# Patient Record
Sex: FEMALE | Race: WHITE | NOT HISPANIC OR LATINO | Employment: FULL TIME | URBAN - METROPOLITAN AREA
[De-identification: names, ages, dates, MRNs, and addresses within clinical notes are randomized per-mention and may not be internally consistent; named-entity substitution may affect disease eponyms.]

---

## 2024-05-30 ENCOUNTER — PROCEDURE VISIT (OUTPATIENT)
Dept: OBSTETRICS AND GYNECOLOGY | Facility: CLINIC | Age: 34
End: 2024-05-30
Payer: COMMERCIAL

## 2024-05-30 ENCOUNTER — OFFICE VISIT (OUTPATIENT)
Dept: OBSTETRICS AND GYNECOLOGY | Facility: CLINIC | Age: 34
End: 2024-05-30
Payer: COMMERCIAL

## 2024-05-30 VITALS — WEIGHT: 140.81 LBS | HEART RATE: 86 BPM | DIASTOLIC BLOOD PRESSURE: 68 MMHG | SYSTOLIC BLOOD PRESSURE: 108 MMHG

## 2024-05-30 DIAGNOSIS — Z11.3 ENCOUNTER FOR SCREENING FOR INFECTIONS WITH PREDOMINANTLY SEXUAL MODE OF TRANSMISSION: ICD-10-CM

## 2024-05-30 DIAGNOSIS — Z87.42 HISTORY OF INFERTILITY: ICD-10-CM

## 2024-05-30 DIAGNOSIS — Z34.90 PREGNANCY, UNSPECIFIED GESTATIONAL AGE: Primary | ICD-10-CM

## 2024-05-30 DIAGNOSIS — R10.9 ABDOMINAL PAIN, UNSPECIFIED ABDOMINAL LOCATION: ICD-10-CM

## 2024-05-30 DIAGNOSIS — Z34.90 PREGNANCY, UNSPECIFIED GESTATIONAL AGE: ICD-10-CM

## 2024-05-30 DIAGNOSIS — Z3A.01 LESS THAN 8 WEEKS GESTATION OF PREGNANCY: ICD-10-CM

## 2024-05-30 DIAGNOSIS — Z01.419 WELL WOMAN EXAM WITH ROUTINE GYNECOLOGICAL EXAM: Primary | ICD-10-CM

## 2024-05-30 DIAGNOSIS — R10.2 PELVIC PAIN: ICD-10-CM

## 2024-05-30 LAB
B-HCG SERPL-ACNC: NORMAL MIU/ML
B-HCG UR QL: POSITIVE
BILIRUB SERPL-MCNC: NEGATIVE MG/DL
BLOOD URINE, POC: NEGATIVE
CLARITY, POC UA: CLEAR
COLOR, POC UA: YELLOW
CTP QC/QA: YES
GLUCOSE UR QL STRIP: NEGATIVE
KETONES UR QL STRIP: NEGATIVE
LEUKOCYTE ESTERASE URINE, POC: NEGATIVE
NITRITE, POC UA: NEGATIVE
PH, POC UA: 6
PROGEST SERPL-MCNC: 20.2 NG/ML
PROTEIN, POC: NEGATIVE
SPECIFIC GRAVITY, POC UA: >1.03
UROBILINOGEN, POC UA: 0.2

## 2024-05-30 PROCEDURE — 99385 PREV VISIT NEW AGE 18-39: CPT | Mod: 25,S$GLB,,

## 2024-05-30 PROCEDURE — 1160F RVW MEDS BY RX/DR IN RCRD: CPT | Mod: CPTII,S$GLB,,

## 2024-05-30 PROCEDURE — 81025 URINE PREGNANCY TEST: CPT | Mod: S$GLB,,,

## 2024-05-30 PROCEDURE — 3074F SYST BP LT 130 MM HG: CPT | Mod: CPTII,S$GLB,,

## 2024-05-30 PROCEDURE — 81002 URINALYSIS NONAUTO W/O SCOPE: CPT | Mod: S$GLB,,,

## 2024-05-30 PROCEDURE — 3044F HG A1C LEVEL LT 7.0%: CPT | Mod: CPTII,S$GLB,,

## 2024-05-30 PROCEDURE — 3078F DIAST BP <80 MM HG: CPT | Mod: CPTII,S$GLB,,

## 2024-05-30 PROCEDURE — 76817 TRANSVAGINAL US OBSTETRIC: CPT | Mod: S$GLB,,, | Performed by: OBSTETRICS & GYNECOLOGY

## 2024-05-30 PROCEDURE — 1159F MED LIST DOCD IN RCRD: CPT | Mod: CPTII,S$GLB,,

## 2024-05-30 RX ORDER — DEXLANSOPRAZOLE 60 MG/1
CAPSULE, DELAYED RELEASE ORAL
COMMUNITY
Start: 2023-01-01

## 2024-05-30 RX ORDER — LINACLOTIDE 145 UG/1
CAPSULE, GELATIN COATED ORAL
COMMUNITY
Start: 2024-05-29

## 2024-05-30 RX ORDER — TEMAZEPAM 30 MG/1
1 CAPSULE ORAL NIGHTLY
COMMUNITY
Start: 2023-05-04

## 2024-05-30 NOTE — PROGRESS NOTES
Subjective:      Patient ID: Nadira Nicole is a 34 y.o. female who presents for evaluation today.    Chief Complaint:    Menorrhagia (Pt states that she started her cycle on 5/13 and has been bleeding since. She is also having left sided abdominal and back pain. She states that her cycle are usually regular and only last 3-4 days. ) and Well Woman      History of Present Illness  Annual Exam (Premenopausal) - Patient presents for annual exam. The patient also has complaints of abdominal/back pain. The patient is sexually active. GYN screening history: last pap: was normal. The patient wears seatbelts: yes. The patient participates in regular exercise: no. Has the patient ever been transfused or tattooed?: not asked. The patient reports that there is not domestic violence in her life. She started her period on may 13, she had a normal flow at first, then continued to have brown blood.  She is also having left sided back & abdominal pain. She denies urinary symptoms. She is on linzess for constipation with Colon. She reports hx of endometriosis, she had a surgery last year to remove this along with a uterine suspension done by Dr. Vidal. She is going to see fertility Dr. Panda as she and her  have been trying to conceive for 4 years.     GYN History  Patient's last menstrual period was 05/13/2024 (exact date).   Date of Last Pap: Up to date in accordance with ASCCP guidelines Pap smear schedule reviewed with patient Result history reviewed with patient Pap smear not performed    VITALS  /68   Pulse 86   Wt 63.9 kg (140 lb 12.8 oz)   LMP 05/13/2024 (Exact Date)   Weight: 63.9 kg (140 lb 12.8 oz)         PAST MEDICAL HISTORY  Past Medical History:   Diagnosis Date    Abnormal uterine bleeding 05/13/24    Chiari malformation type I     Dyspareunia 2016    Endometriosis of uterus 2016    Hormone disorder 2021    Infertility, female 2016       PAST SURGICAL HISTORY  Past Surgical History:    Procedure Laterality Date    ABDOMINAL SURGERY  05/02/2022    AUGMENTATION OF BREAST  2018    HYSTEROSCOPY  05/02/2022    PELVIC LAPAROSCOPY  2017 & 2018    SUBOCCIPITAL CRANIECTOMY WITH LAMINECTOMY OF CERVICAL SPINE  01/17/2024       SOCIAL HISTORY  Social History     Tobacco Use   Smoking Status Never   Smokeless Tobacco Never   ,   Social History     Substance and Sexual Activity   Alcohol Use Yes    Comment: Socially        MEDICATIONS  Outpatient Medications Marked as Taking for the 5/30/24 encounter (Office Visit) with Yolande Isabel NP   Medication Sig Dispense Refill    DEXILANT 60 mg capsule       LINZESS 145 mcg Cap capsule       temazepam (RESTORIL) 30 mg capsule Take 1 capsule by mouth every evening.           Review of Systems   Review of Systems   Constitutional:  Negative for activity change, chills and fever.   Eyes:  Negative for visual disturbance.   Respiratory:  Negative for shortness of breath.    Cardiovascular:  Negative for chest pain.   Gastrointestinal:  Positive for constipation and nausea. Negative for abdominal pain, diarrhea and vomiting.   Genitourinary:  Positive for pelvic pain. Negative for dysuria, frequency, hematuria, urgency and vaginal bleeding.        No abnormal vaginal bleeding   Musculoskeletal:  Positive for back pain.   Integumentary:  Negative for rash and breast mass.   Neurological:  Negative for numbness and headaches.   Psychiatric/Behavioral:          No mood disturbance or changes    Breast: Negative for mass          Objective:     Physical Exam:   Constitutional: She is oriented to person, place, and time. She appears well-developed. She is cooperative.    HENT:   Head: Normocephalic.     Neck: Trachea normal. No thyromegaly present.    Cardiovascular:  Normal rate, regular rhythm and normal heart sounds.             Pulmonary/Chest: Effort normal and breath sounds normal. Right breast exhibits no mass, no nipple discharge and no skin change. Left breast  exhibits no mass, no nipple discharge and no skin change.        Abdominal: Soft. There is no abdominal tenderness. There is no rebound and no guarding.     Genitourinary:    Vagina and uterus normal.      Pelvic exam was performed with patient supine.   The external female genitalia was normal.     Labial bartholins normal.There is no lesion on the right labia. There is no lesion on the left labia. Cervix is normal. Right adnexum displays no mass and no tenderness. Left adnexum displays tenderness. Left adnexum displays no mass. Cervix exhibits no discharge. Uterus is not enlarged and not tender.              Lymphadenopathy:        Head (right side): No submental and no submandibular adenopathy present.        Head (left side): No submental and no submandibular adenopathy present.     She has no cervical adenopathy.    Neurological: She is alert and oriented to person, place, and time.    Skin: Skin is warm.    Psychiatric: She has a normal mood and affect. Her speech is normal and behavior is normal. Thought content normal.          Assessment:        1. Well woman exam with routine gynecological exam    2. Pelvic pain    3. Encounter for screening for infections with predominantly sexual mode of transmission    4. Less than 8 weeks gestation of pregnancy       Well woman exam with routine gynecological exam    Pelvic pain  -     POCT urine pregnancy  -     POCT urine dipstick without microscope    Encounter for screening for infections with predominantly sexual mode of transmission  -     C. trachomatis/N. gonorrhoeae by AMP DNA Other; Cervicovaginal    Less than 8 weeks gestation of pregnancy  -     HCG, Quantitative; Future; Expected date: 06/01/2024  -     Progesterone; Future; Expected date: 05/30/2024  -     HCG, Quantitative; Future; Expected date: 06/01/2024  -     Urinalysis, Reflex to Urine Culture Urine, Clean Catch; Future  -     CBC Auto Differential; Future; Expected date: 05/30/2024  -      Hepatitis B Surface Antigen; Future; Expected date: 05/30/2024  -     Hepatitis C Antibody; Future; Expected date: 05/30/2024  -     HSV 1 & 2, IgG; Future; Expected date: 05/30/2024  -     Type & Screen; Future; Expected date: 05/30/2024  -     Antibody Screen; Future; Expected date: 05/30/2024  -     Rubella Antibody, IgG; Future; Expected date: 05/30/2024  -     Treponema Pallidum (Syphillis) Antibody; Future; Expected date: 05/30/2024  -     TSH; Future; Expected date: 05/30/2024  -     T4, Free; Future; Expected date: 05/30/2024  -     HIV 1/2 Ag/Ab (4th Gen); Future; Expected date: 05/30/2024         Plan:     Patient instructed to contact the clinic should any questions or concerns arise prior to her next office visit. Patient is happy with the plan of care at this time, verbalizes understanding and denies outstanding questions.      Normal ultrasound reviewed with ptSARATH approx 7 weeks  Initial OB labs given, intake visit scheduled with Bridget  If you don't hear from the office regarding results within 1 week, please call  Chaperone present for exam

## 2024-05-31 ENCOUNTER — PATIENT MESSAGE (OUTPATIENT)
Dept: OBSTETRICS AND GYNECOLOGY | Facility: CLINIC | Age: 34
End: 2024-05-31
Payer: COMMERCIAL

## 2024-05-31 LAB
ABS NRBC COUNT: 0 X 10 3/UL (ref 0–0.01)
ABSOLUTE BASOPHIL: 0.08 X 10 3/UL (ref 0–0.22)
ABSOLUTE EOSINOPHIL: 0.17 X 10 3/UL (ref 0.04–0.54)
ABSOLUTE IMMATURE GRAN: 0.05 X 10 3/UL (ref 0–0.04)
ABSOLUTE LYMPHOCYTE: 3.47 X 10 3/UL (ref 0.86–4.75)
ABSOLUTE MONOCYTE: 0.89 X 10 3/UL (ref 0.22–1.08)
AMORPH URATE CRY URNS QL MICRO: NEGATIVE
ANTIBODY SCREEN: NEGATIVE
BACTERIA #/AREA URNS HPF: ABNORMAL /[HPF]
BASOPHILS NFR BLD: 0.6 % (ref 0.2–1.2)
BILIRUB UR QL STRIP: NEGATIVE
BLOOD GROUPING: NORMAL
BLOOD TYPE (D): NEGATIVE
CLARITY UR: CLEAR
COLOR UR: YELLOW
EOSINOPHIL NFR BLD: 1.3 % (ref 0.7–7)
EPITHELIAL CELLS: ABNORMAL
GLUCOSE (UA): NEGATIVE MG/DL
HBV SURFACE AG SERPL QL IA: NONREACTIVE
HCT VFR BLD AUTO: 37.7 % (ref 37–47)
HCV IGG SERPL QL IA: NONREACTIVE
HGB BLD-MCNC: 13.1 G/DL (ref 12–16)
HIV 1+2 AB+HIV1 P24 AG SERPL QL IA: NONREACTIVE
HSV1 IGG SER-ACNC: NONREACTIVE
HSV2 IGG SER-ACNC: NONREACTIVE
IMMATURE GRANULOCYTES: 0.4 % (ref 0–0.5)
KETONES UR QL STRIP: NEGATIVE MG/DL
LEUKOCYTE ESTERASE UR QL STRIP: NEGATIVE
LYMPHOCYTES NFR BLD: 26.1 % (ref 19.3–53.1)
MCH RBC QN AUTO: 31.3 PG (ref 27–32)
MCHC RBC AUTO-ENTMCNC: 34.7 G/DL (ref 32–36)
MCV RBC AUTO: 90 FL (ref 82–100)
MONOCYTES NFR BLD: 6.7 % (ref 4.7–12.5)
MUCOUS THREADS URNS QL MICRO: ABNORMAL
NEUTROPHILS # BLD AUTO: 8.62 X 10 3/UL (ref 2.15–7.56)
NEUTROPHILS NFR BLD: 64.9 % (ref 34–71.1)
NITRITE UR QL STRIP: NEGATIVE
NUCLEATED RED BLOOD CELLS: 0 /100 WBC (ref 0–0.2)
OCCULT BLOOD: NEGATIVE
PH, URINE: 5 (ref 5–7.5)
PLATELET # BLD AUTO: 254 X 10 3/UL (ref 135–400)
PROT UR QL STRIP: NEGATIVE MG/DL
RBC # BLD AUTO: 4.19 X 10 6/UL (ref 4.2–5.4)
RBC/HPF: ABNORMAL
RDW-SD: 37.9 FL (ref 37–54)
RH, WEAK D (DU): NEGATIVE
RUBELLA IGG SCREEN: NORMAL
SP GR UR STRIP: 1.01 (ref 1–1.03)
SYPHILIS TREPONEMAL ANTIBODY: NONREACTIVE
T4, FREE: 1.5 NG/DL (ref 0.93–1.7)
TSH SERPL DL<=0.005 MIU/L-ACNC: 1.01 UIU/ML (ref 0.27–4.2)
UROBILINOGEN, URINE: NORMAL E.U./DL (ref 0–1)
WBC # BLD: 13.28 X 10 3/UL (ref 4.3–10.8)
WBC/HPF: ABNORMAL

## 2024-06-01 LAB — B-HCG SERPL-ACNC: NORMAL MIU/ML

## 2024-06-03 LAB
CHLAMYDIA: NEGATIVE
GONORRHEA: NEGATIVE
SOURCE: NORMAL

## 2024-06-04 ENCOUNTER — CLINICAL SUPPORT (OUTPATIENT)
Dept: OBSTETRICS AND GYNECOLOGY | Facility: CLINIC | Age: 34
End: 2024-06-04
Payer: COMMERCIAL

## 2024-06-04 VITALS — SYSTOLIC BLOOD PRESSURE: 117 MMHG | WEIGHT: 140.81 LBS | HEART RATE: 108 BPM | DIASTOLIC BLOOD PRESSURE: 71 MMHG

## 2024-06-04 DIAGNOSIS — Z34.90 PREGNANCY, UNSPECIFIED GESTATIONAL AGE: ICD-10-CM

## 2024-06-04 DIAGNOSIS — R79.89 LOW SERUM PROGESTERONE: Primary | ICD-10-CM

## 2024-06-04 RX ORDER — PROGESTERONE 200 MG/1
200 CAPSULE ORAL DAILY
Qty: 30 CAPSULE | Refills: 2 | Status: CANCELLED | OUTPATIENT
Start: 2024-06-04

## 2024-06-04 NOTE — PROGRESS NOTES
Patient is in for OB INTAKE appointment.   Referred by Yolande.    OB labs have been drawn and all results discussed with patient.   Progesterone 20.2  Ultrasound on 05/30/2024 revealed IUP with Heart Rate of 135.  Patient denies any concerns today.   Prenatal Genetic Testing offered and considering

## 2024-06-05 ENCOUNTER — PATIENT MESSAGE (OUTPATIENT)
Dept: OBSTETRICS AND GYNECOLOGY | Facility: CLINIC | Age: 34
End: 2024-06-05
Payer: COMMERCIAL

## 2024-06-18 ENCOUNTER — PROCEDURE VISIT (OUTPATIENT)
Dept: OBSTETRICS AND GYNECOLOGY | Facility: CLINIC | Age: 34
End: 2024-06-18
Payer: COMMERCIAL

## 2024-06-18 ENCOUNTER — INITIAL PRENATAL (OUTPATIENT)
Dept: OBSTETRICS AND GYNECOLOGY | Facility: CLINIC | Age: 34
End: 2024-06-18
Payer: COMMERCIAL

## 2024-06-18 VITALS — DIASTOLIC BLOOD PRESSURE: 73 MMHG | SYSTOLIC BLOOD PRESSURE: 120 MMHG | WEIGHT: 145 LBS | HEART RATE: 73 BPM

## 2024-06-18 DIAGNOSIS — Z34.90 PREGNANCY, UNSPECIFIED GESTATIONAL AGE: Primary | ICD-10-CM

## 2024-06-18 DIAGNOSIS — Z34.90 PREGNANCY, UNSPECIFIED GESTATIONAL AGE: ICD-10-CM

## 2024-06-18 PROCEDURE — 76801 OB US < 14 WKS SINGLE FETUS: CPT | Mod: S$GLB,,, | Performed by: OBSTETRICS & GYNECOLOGY

## 2024-06-18 PROCEDURE — 0500F INITIAL PRENATAL CARE VISIT: CPT | Mod: CPTII,S$GLB,, | Performed by: OBSTETRICS & GYNECOLOGY

## 2024-06-18 NOTE — PROGRESS NOTES
CHIEF COMPLAINT:   Patient presents with      initial OB        HISTORY OF PRESENT ILLNESS  Nadira Nicole 34 y.o.  presents for initial OB  The patient has no complaints today.  Nausea discussed and fluid intake including sprite, gator aid, cool aid,    No bleeding or pain.    Genetic testing is discussed in detail with her .   Denies chemical/pesticide/radiation exposure.    OB history:  G1    LMP: Patient's last menstrual period was 2024 (exact date).  EDC: Estimated Date of Delivery: 25  EGA: 9w5d       Health Maintenance   Topic Date Due    Lipid Panel  Never done    TETANUS VACCINE  2027    Hepatitis C Screening  Completed       Past Medical History:   Diagnosis Date    Abnormal uterine bleeding 24    Chiari malformation type I     Dyspareunia 2016    Endometriosis of uterus 2016    Hormone disorder     Infertility, female 2016       Past Surgical History:   Procedure Laterality Date    ABDOMINAL SURGERY  2022    AUGMENTATION OF BREAST  2018    HYSTEROSCOPY  2022    PELVIC LAPAROSCOPY  2017 & 2018    SUBOCCIPITAL CRANIECTOMY WITH LAMINECTOMY OF CERVICAL SPINE  2024    Dr Diallo [facility]       Family History   Problem Relation Name Age of Onset    Heart failure Father Eric Marquez         Heart Attack, still living    Hypertension Mother Josselyn Marquez     Eclampsia Sister      Diabetes Neg Hx      Breast cancer Neg Hx      Ovarian cancer Neg Hx      Cervical cancer Neg Hx      Uterine cancer Neg Hx      Stroke Neg Hx       labor Neg Hx      Miscarriages / Stillbirths Neg Hx      Colon cancer Neg Hx         Social History     Socioeconomic History    Marital status:    Tobacco Use    Smoking status: Never    Smokeless tobacco: Never   Substance and Sexual Activity    Alcohol use: Not Currently     Comment: Socially    Drug use: Never    Sexual activity: Yes     Partners: Male     Birth control/protection: None       Current Outpatient  Medications   Medication Sig Dispense Refill    DEXILANT 60 mg capsule       LINZESS 145 mcg Cap capsule  (Patient not taking: Reported on 6/4/2024)      prenatal 25/iron/folate 6/dha (PRENA1 ORAL) Take by mouth.      temazepam (RESTORIL) 30 mg capsule Take 1 capsule by mouth every evening. (Patient not taking: Reported on 6/4/2024)       No current facility-administered medications for this visit.       Review of patient's allergies indicates:  No Known Allergies      PHYSICAL EXAM   Vitals:    06/18/24 1514   BP: 120/73   Pulse: 73        PAIN SCALE: 0/10 None    PHYSICAL EXAM    ROS:  GENERAL: No fever, chills, fatigability.  CV: Denies chest pain  PULM: Denies shortness of breath or wheezing.  ABDOMEN denies  abdominal pain, hematemesis or blood in stool.  URINARY: No flank pain, dysuria or hematuria.  REPRODUCTIVE: No abnormal vaginal bleeding.       PE:   APPEARANCE: Well nourished, well developed, in no acute distress  CHEST: Clear to auscultation bilaterally  CV: Regular rate and rhythm  BREASTS: Symmetrical, no skin changes or visible lesions. No palpable masses, nipple discharge or adenopathy bilaterally. Mild bilateral fibrocystic changes  ABDOMEN: Soft. No tenderness or masses. No hepatosplenomegaly. No hernias  PELVIC:   VULVA: No lesions. Normal female genitalia.  URETHRAL MEATUS: Normal size and location, no lesions, no prolapse.  URETHRA: No masses, tenderness, prolapse or scarring.  VAGINA: Moist and well rugated, no discharge, no significant cystocele or rectocele.  CERVIX: No lesions, normal diameter, no stenosis, no cervical motion tenderness.   UTERUS: size, regular shape, mobile, non-tender, normal position, good support.  ADNEXA: No masses, tenderness or CDS nodularity.  ANUS PERINEUM: No lesions, no relaxation, no external hemorrhoids.     UPT +    A/P:     -      Patient was counseled today on A.C.S. Pap guidelines and recommendations for yearly pelvic exams, mammograms and monthly self  breast exams; to see her PCP for other health maintenance and pregnancy.   -      Patient's medications and medical history reviewed with patient and implications in pregnancy.   -      Pregnancy course discussed and 'AtoZ' book given. Patient was counseled on proper weight gain based on the [place] [employer]'s recommendations based on her pre-pregnancy weight. Discussed foods to avoid in pregnancy (i.e. sushi, fish that are high in mercury, deli meat, and unpasteurized cheeses). Discussed prenatal vitamin options (i.e. stool softener, DHA). Discussed potential medical problems in pregnancy.  -     Discussed risk of Toxoplasmosis transmission from pets and reviewed risk reduction techniques.  -     -       NO GT  -     Pt was counseled on exercise in pregnancy and weight gain recommendations.  -     Pt was counseled on travel recommendations and on risks of Covid 19 virus exposure.    -     Covid prevention reviewed with patient  -     Oriented to practice including call coverage   -     Follow-up routine OB  labs and u/s-- .   -       Pt is aware we call all results. If she does not hear from our office regarding her result within a week of having a study or procedure performed she is to call the office so that we can research the result for her as I do not know when she is scheduling her procedures.

## 2024-06-20 LAB
CHLAMYDIA: NEGATIVE
GONORRHEA: NEGATIVE
SOURCE: NORMAL

## 2024-07-15 ENCOUNTER — ROUTINE PRENATAL (OUTPATIENT)
Dept: OBSTETRICS AND GYNECOLOGY | Facility: CLINIC | Age: 34
End: 2024-07-15
Payer: COMMERCIAL

## 2024-07-15 VITALS — SYSTOLIC BLOOD PRESSURE: 118 MMHG | DIASTOLIC BLOOD PRESSURE: 79 MMHG | WEIGHT: 147 LBS | HEART RATE: 76 BPM

## 2024-07-15 DIAGNOSIS — Z34.90 PREGNANCY, UNSPECIFIED GESTATIONAL AGE: Primary | ICD-10-CM

## 2024-07-15 PROCEDURE — 0502F SUBSEQUENT PRENATAL CARE: CPT | Mod: CPTII,S$GLB,, | Performed by: OBSTETRICS & GYNECOLOGY

## 2024-07-15 NOTE — PROGRESS NOTES
34 y.o. female  at 13w5d   Denies VB,  Nausea discussed   Doing well   TW   Reviewed prenatal labs  Genetic testing offered and discussed   Dating ultrasound discussed   Reviewed warning signs, pregnancy precautions and how/when to call.  RTC x 4 wks, call or present sooner prn.   She would like to do GT

## 2024-07-25 ENCOUNTER — PATIENT MESSAGE (OUTPATIENT)
Dept: OBSTETRICS AND GYNECOLOGY | Facility: CLINIC | Age: 34
End: 2024-07-25
Payer: COMMERCIAL

## 2024-07-29 ENCOUNTER — PATIENT MESSAGE (OUTPATIENT)
Dept: OBSTETRICS AND GYNECOLOGY | Facility: CLINIC | Age: 34
End: 2024-07-29
Payer: COMMERCIAL

## 2024-07-31 ENCOUNTER — PATIENT MESSAGE (OUTPATIENT)
Dept: OTHER | Facility: OTHER | Age: 34
End: 2024-07-31
Payer: COMMERCIAL

## 2024-08-07 ENCOUNTER — PATIENT MESSAGE (OUTPATIENT)
Dept: OTHER | Facility: OTHER | Age: 34
End: 2024-08-07
Payer: COMMERCIAL

## 2024-08-08 ENCOUNTER — TELEPHONE (OUTPATIENT)
Dept: OBSTETRICS AND GYNECOLOGY | Facility: CLINIC | Age: 34
End: 2024-08-08
Payer: COMMERCIAL

## 2024-08-08 DIAGNOSIS — Z3A.21 21 WEEKS GESTATION OF PREGNANCY: Primary | ICD-10-CM

## 2024-08-15 ENCOUNTER — ROUTINE PRENATAL (OUTPATIENT)
Dept: OBSTETRICS AND GYNECOLOGY | Facility: CLINIC | Age: 34
End: 2024-08-15
Payer: COMMERCIAL

## 2024-08-15 VITALS — DIASTOLIC BLOOD PRESSURE: 77 MMHG | HEART RATE: 83 BPM | WEIGHT: 153 LBS | SYSTOLIC BLOOD PRESSURE: 112 MMHG

## 2024-08-15 DIAGNOSIS — Z36.89 ENCOUNTER FOR ULTRASOUND TO ASSESS FETAL ANATOMY AND GROWTH IN TWIN PREGNANCY, ANTEPARTUM: Primary | ICD-10-CM

## 2024-08-15 DIAGNOSIS — O30.009 ENCOUNTER FOR ULTRASOUND TO ASSESS FETAL ANATOMY AND GROWTH IN TWIN PREGNANCY, ANTEPARTUM: Primary | ICD-10-CM

## 2024-08-15 DIAGNOSIS — Z34.90 PREGNANCY, UNSPECIFIED GESTATIONAL AGE: ICD-10-CM

## 2024-08-15 NOTE — PROGRESS NOTES
34 y.o. female  at 18w1d   feeling flutters/FM, denies VB, leaking of fluid or cramping  Doing well   TW   Reviewed prenatal lab  Anatomy US ordered  Reviewed warning signs, pregnancy precautions and how/when to call.  RTC x 4 wks, call or present sooner prn.

## 2024-08-28 ENCOUNTER — PATIENT MESSAGE (OUTPATIENT)
Dept: OTHER | Facility: OTHER | Age: 34
End: 2024-08-28
Payer: COMMERCIAL

## 2024-09-05 ENCOUNTER — ROUTINE PRENATAL (OUTPATIENT)
Dept: OBSTETRICS AND GYNECOLOGY | Facility: CLINIC | Age: 34
End: 2024-09-05
Payer: COMMERCIAL

## 2024-09-05 ENCOUNTER — PROCEDURE VISIT (OUTPATIENT)
Dept: OBSTETRICS AND GYNECOLOGY | Facility: CLINIC | Age: 34
End: 2024-09-05
Payer: COMMERCIAL

## 2024-09-05 VITALS — WEIGHT: 161.19 LBS | SYSTOLIC BLOOD PRESSURE: 127 MMHG | HEART RATE: 111 BPM | DIASTOLIC BLOOD PRESSURE: 77 MMHG

## 2024-09-05 DIAGNOSIS — Z34.90 PREGNANCY, UNSPECIFIED GESTATIONAL AGE: Primary | ICD-10-CM

## 2024-09-05 DIAGNOSIS — N13.30 PYELECTASIS: ICD-10-CM

## 2024-09-05 DIAGNOSIS — Z3A.21 21 WEEKS GESTATION OF PREGNANCY: ICD-10-CM

## 2024-09-05 DIAGNOSIS — Z09 FOLLOW-UP EXAMINATION AFTER GYNECOLOGICAL SURGERY: ICD-10-CM

## 2024-09-05 RX ORDER — ASPIRIN 81 MG/1
81 TABLET ORAL DAILY
COMMUNITY
Start: 2024-09-05 | End: 2025-06-12

## 2024-09-05 NOTE — PROGRESS NOTES
34 y.o. female  at 21w1d   Starting to feel flutters/FM, denies VB, leaking of fluid, cramping  Doing well   TW   anatomy US  Reviewed warning signs, normal FM,  labor precautions and how/when to call.  RTC x 4 wks, call or present sooner prn.   Discussed her u/s recheck kidneys and some anatomy in 4 weeks

## 2024-09-25 ENCOUNTER — PATIENT MESSAGE (OUTPATIENT)
Dept: OTHER | Facility: OTHER | Age: 34
End: 2024-09-25
Payer: COMMERCIAL

## 2024-10-03 ENCOUNTER — ROUTINE PRENATAL (OUTPATIENT)
Dept: OBSTETRICS AND GYNECOLOGY | Facility: CLINIC | Age: 34
End: 2024-10-03
Payer: COMMERCIAL

## 2024-10-03 ENCOUNTER — PROCEDURE VISIT (OUTPATIENT)
Dept: OBSTETRICS AND GYNECOLOGY | Facility: CLINIC | Age: 34
End: 2024-10-03
Payer: COMMERCIAL

## 2024-10-03 VITALS — HEART RATE: 76 BPM | DIASTOLIC BLOOD PRESSURE: 70 MMHG | SYSTOLIC BLOOD PRESSURE: 110 MMHG | WEIGHT: 165 LBS

## 2024-10-03 DIAGNOSIS — Z36.2 ENCOUNTER FOR FOLLOW-UP ULTRASOUND OF FETAL ANATOMY: ICD-10-CM

## 2024-10-03 DIAGNOSIS — N13.30 PYELECTASIS: ICD-10-CM

## 2024-10-03 DIAGNOSIS — Z09 FOLLOW-UP EXAMINATION AFTER GYNECOLOGICAL SURGERY: ICD-10-CM

## 2024-10-03 DIAGNOSIS — Z34.90 PREGNANCY, UNSPECIFIED GESTATIONAL AGE: Primary | ICD-10-CM

## 2024-10-03 PROCEDURE — 0502F SUBSEQUENT PRENATAL CARE: CPT | Mod: CPTII,,, | Performed by: OBSTETRICS & GYNECOLOGY

## 2024-10-03 PROCEDURE — 76816 OB US FOLLOW-UP PER FETUS: CPT | Mod: 26,S$PBB,, | Performed by: OBSTETRICS & GYNECOLOGY

## 2024-10-03 RX ORDER — HUMAN RHO(D) IMMUNE GLOBULIN 300 UG/1
300 INJECTION, SOLUTION INTRAMUSCULAR ONCE
Qty: 300 MCG | Refills: 0 | Status: SHIPPED | OUTPATIENT
Start: 2024-10-03 | End: 2024-10-03

## 2024-10-03 NOTE — PROGRESS NOTES
34 y.o. female  at 25w1d   Reports + FM, denies VB, leaking of fluid, or cramping  Doing well   TW  Reviewed upcoming 28wk labs,  and orders placed, tdap handout provided and explained  Reviewed warning signs, normal FM,  labor precautions and how/when to call.  RTC x 4 wks, call or present sooner prn.

## 2024-10-09 ENCOUNTER — PATIENT MESSAGE (OUTPATIENT)
Dept: OTHER | Facility: OTHER | Age: 34
End: 2024-10-09
Payer: COMMERCIAL

## 2024-10-18 DIAGNOSIS — R73.09 ELEVATED GLUCOSE TOLERANCE TEST: Primary | ICD-10-CM

## 2024-10-23 ENCOUNTER — PATIENT MESSAGE (OUTPATIENT)
Dept: OTHER | Facility: OTHER | Age: 34
End: 2024-10-23
Payer: COMMERCIAL

## 2024-10-24 LAB
ABO + RH BLD: NORMAL
INDIRECT COOMBS: NEGATIVE
RH IMMUNE GLOBULIN: NORMAL

## 2024-10-31 ENCOUNTER — PATIENT MESSAGE (OUTPATIENT)
Dept: OBSTETRICS AND GYNECOLOGY | Facility: CLINIC | Age: 34
End: 2024-10-31

## 2024-10-31 ENCOUNTER — PROCEDURE VISIT (OUTPATIENT)
Dept: OBSTETRICS AND GYNECOLOGY | Facility: CLINIC | Age: 34
End: 2024-10-31
Payer: COMMERCIAL

## 2024-10-31 ENCOUNTER — ROUTINE PRENATAL (OUTPATIENT)
Dept: OBSTETRICS AND GYNECOLOGY | Facility: CLINIC | Age: 34
End: 2024-10-31
Payer: COMMERCIAL

## 2024-10-31 ENCOUNTER — CLINICAL SUPPORT (OUTPATIENT)
Dept: OBSTETRICS AND GYNECOLOGY | Facility: CLINIC | Age: 34
End: 2024-10-31
Payer: COMMERCIAL

## 2024-10-31 VITALS — WEIGHT: 173.81 LBS | HEART RATE: 84 BPM | DIASTOLIC BLOOD PRESSURE: 70 MMHG | SYSTOLIC BLOOD PRESSURE: 113 MMHG

## 2024-10-31 DIAGNOSIS — N13.30 PYELECTASIS: ICD-10-CM

## 2024-10-31 DIAGNOSIS — Z3A.29 29 WEEKS GESTATION OF PREGNANCY: Primary | ICD-10-CM

## 2024-10-31 DIAGNOSIS — Z34.90 PREGNANCY, UNSPECIFIED GESTATIONAL AGE: ICD-10-CM

## 2024-10-31 DIAGNOSIS — Z34.90 PREGNANCY, UNSPECIFIED GESTATIONAL AGE: Primary | ICD-10-CM

## 2024-10-31 DIAGNOSIS — Z36.2 ENCOUNTER FOR FOLLOW-UP ULTRASOUND OF FETAL ANATOMY: ICD-10-CM

## 2024-10-31 PROCEDURE — 0502F SUBSEQUENT PRENATAL CARE: CPT | Mod: CPTII,,, | Performed by: OBSTETRICS & GYNECOLOGY

## 2024-10-31 PROCEDURE — 76816 OB US FOLLOW-UP PER FETUS: CPT | Mod: 26,S$PBB,, | Performed by: OBSTETRICS & GYNECOLOGY

## 2024-11-06 ENCOUNTER — PATIENT MESSAGE (OUTPATIENT)
Dept: OTHER | Facility: OTHER | Age: 34
End: 2024-11-06
Payer: COMMERCIAL

## 2024-11-12 DIAGNOSIS — O35.9XX0 SUSPECTED FETAL ABNORMALITY AFFECTING MANAGEMENT OF MOTHER, SINGLE OR UNSPECIFIED FETUS: Primary | ICD-10-CM

## 2024-11-18 NOTE — PROGRESS NOTES
"Initial MFM Consultation  Consulting provider: Dr. Jazmin Thacker  Referring provider: Dr. Cardona    Indications for referral:  1) Pregnancy at 32w1d  2) Suspected bilateral fetal pyelectasis on screening ultrasound    Dear Dr. Cardona,  Thank you for your kind request for consultation and imaging of your patient at the [facility].  She presents due to the above listed indications.  As you know she is a 34 y.o. with an Estimated Date of Delivery: 1/15/25.   She informs me she has had a low risk NIPT this pregnancy.    PMH: Chiari malformation type 1, endometriosis  PSH: C-spine laminectomy, suboccipital craniotomy, Breast augmentation, Hysteroscopy, laparoscopy  POB:G1  FH: Htn, heart failure  Social: Denies tobacco, alcohol, and illicit substance use.  ALL: NKDA  MEDS:ASA, nexium, linzess  ROS: No complaints    Physical Exam  LMP 04/11/2024 (Exact Date) BP 94/60   Pulse 102   Resp 18   Ht 5' 2" (1.575 m)   Wt 78 kg (172 lb)   LMP 04/11/2024 (Exact Date)   SpO2 98%   BMI 31.46 kg/m²   General: Age appearing female in no apparent distress.  HEENT:  Normal external facial features. No scleral icterus.  CHEST:  Normal respiratory effort and able to speak in sentences without difficulty.  ABDOMEN:  Gravid, soft, nontender  EXTREMITIES: Without clubbing, cyanosis, edema  NEURO: No focal deficits.  MENTAL STATUS: No focal deficits.    ULTRASOUND FINDINGS:  A detailed fetal anatomic survey was performed. The fetus is breech. EFW of 2329 grams is at the 55th percentile.  The placenta is posterior.  Amniotic fluid is normal. There is bilateral upper urinary tract dilation measuring 8mm and 9mm.  The ureters, bladder, and renal parenchyma appear normal. There are no fetal structural malformations to extent of view.  Please see accompanying detailed Viewpoint report.    IMPRESSION:     1) 32 week gestation  2) Mild bilateral upper urinary tract dilation with low risk " NIPT    RECOMMENDATIONS/DISCUSSION:  The patient was advised that upper urinary tract dilation in isolation is usually  a result of vesicoureteroreflux.  It usually resolves during pregnancy or shortly after birth.  In most cases it is not associated with untoward effects of the , child.  It is considered a soft marker for aneuploidy, but she has had a low risk NIPT which has a better detection rate for aneuploidy than does any sonographic finding.  We will see her back in 6 weeks to evaluate for stability.  If this persists, pediatricians should be informed of this finding at delivery.    Thank you for allowing us to participate in her care.  Please do not hesitate to call with questions.  For any questions feel free to call our oncall RANDEE  at [phone].  -Jazmin Thacker MD

## 2024-11-20 ENCOUNTER — PATIENT MESSAGE (OUTPATIENT)
Dept: OTHER | Facility: OTHER | Age: 34
End: 2024-11-20
Payer: COMMERCIAL

## 2024-11-21 ENCOUNTER — ROUTINE PRENATAL (OUTPATIENT)
Dept: OBSTETRICS AND GYNECOLOGY | Facility: CLINIC | Age: 34
End: 2024-11-21
Payer: COMMERCIAL

## 2024-11-21 ENCOUNTER — OFFICE VISIT (OUTPATIENT)
Dept: MATERNAL FETAL MEDICINE | Facility: CLINIC | Age: 34
End: 2024-11-21
Payer: COMMERCIAL

## 2024-11-21 VITALS
DIASTOLIC BLOOD PRESSURE: 60 MMHG | BODY MASS INDEX: 31.65 KG/M2 | RESPIRATION RATE: 18 BRPM | WEIGHT: 172 LBS | HEART RATE: 102 BPM | OXYGEN SATURATION: 98 % | HEIGHT: 62 IN | SYSTOLIC BLOOD PRESSURE: 94 MMHG

## 2024-11-21 VITALS
SYSTOLIC BLOOD PRESSURE: 126 MMHG | BODY MASS INDEX: 32.01 KG/M2 | HEART RATE: 88 BPM | DIASTOLIC BLOOD PRESSURE: 75 MMHG | WEIGHT: 175 LBS

## 2024-11-21 DIAGNOSIS — Z34.90 PREGNANCY, UNSPECIFIED GESTATIONAL AGE: Primary | ICD-10-CM

## 2024-11-21 DIAGNOSIS — Z3A.29 29 WEEKS GESTATION OF PREGNANCY: ICD-10-CM

## 2024-11-21 DIAGNOSIS — O35.9XX0 SUSPECTED FETAL ABNORMALITY AFFECTING MANAGEMENT OF MOTHER, SINGLE OR UNSPECIFIED FETUS: ICD-10-CM

## 2024-11-21 DIAGNOSIS — N13.30 PYELECTASIS: ICD-10-CM

## 2024-11-21 PROCEDURE — 0502F SUBSEQUENT PRENATAL CARE: CPT | Mod: CPTII,,, | Performed by: OBSTETRICS & GYNECOLOGY

## 2024-11-21 NOTE — PROGRESS NOTES
"Nadira is here for initial MFM consultation, referred by Dr. Cardona for bilateral fetal pyelectasis.    She is feeling fetal movement.    Nadira denies vaginal bleeding, loss of fluid, recurrent contractions.    She is taking ASA 81 mg PO daily, Linzess, OTC Nexium, and prenatal gummies.      Vitals:    11/21/24 0924   BP: 94/60   Pulse: 102   Resp: 18   SpO2: 98%   Weight: 78 kg (172 lb)   Height: 5' 2" (1.575 m)      BMI:                    31.46 kg/m^2               "

## 2024-11-21 NOTE — PROGRESS NOTES
34 y.o. female  at 32w1d   Reports normal fetal mvts, denies vaginal bleeding, Leaking fluid  or regular contractions  Doing well  TW  Tdap and covid vaccines discussed RSV  Reviewed warning signs, normal fetal movements,  labor precautions discussed in detail   RTC x 2 wks, call or present sooner prn.    Saw MFRANDEE today she will rtn in 6 weeks with them  consents signed in detail

## 2024-12-05 ENCOUNTER — ROUTINE PRENATAL (OUTPATIENT)
Dept: OBSTETRICS AND GYNECOLOGY | Facility: CLINIC | Age: 34
End: 2024-12-05
Payer: COMMERCIAL

## 2024-12-05 VITALS
SYSTOLIC BLOOD PRESSURE: 113 MMHG | HEART RATE: 84 BPM | DIASTOLIC BLOOD PRESSURE: 73 MMHG | BODY MASS INDEX: 31.97 KG/M2 | WEIGHT: 174.81 LBS

## 2024-12-05 DIAGNOSIS — Z34.90 PREGNANCY, UNSPECIFIED GESTATIONAL AGE: Primary | ICD-10-CM

## 2024-12-05 PROCEDURE — 0502F SUBSEQUENT PRENATAL CARE: CPT | Mod: CPTII,,, | Performed by: OBSTETRICS & GYNECOLOGY

## 2024-12-05 NOTE — PROGRESS NOTES
34 y.o. female  at 34w1d   Reports normal fetal mvts, denies vaginal bleeding, Leaking fluid  or regular contractions  Doing well  TW   (BN)  Tdap covid vaccines discussed     Reviewed warning signs, normal fetal movements,  labor precautions in detail   RTC x 2 wks, call or present sooner prn.

## 2024-12-11 ENCOUNTER — PATIENT MESSAGE (OUTPATIENT)
Dept: OTHER | Facility: OTHER | Age: 34
End: 2024-12-11
Payer: COMMERCIAL

## 2024-12-12 ENCOUNTER — ROUTINE PRENATAL (OUTPATIENT)
Dept: OBSTETRICS AND GYNECOLOGY | Facility: CLINIC | Age: 34
End: 2024-12-12
Payer: COMMERCIAL

## 2024-12-12 VITALS
HEART RATE: 86 BPM | BODY MASS INDEX: 32.52 KG/M2 | WEIGHT: 177.81 LBS | DIASTOLIC BLOOD PRESSURE: 72 MMHG | SYSTOLIC BLOOD PRESSURE: 124 MMHG

## 2024-12-12 DIAGNOSIS — Z34.93 ENCOUNTER FOR SUPERVISION OF NORMAL PREGNANCY IN THIRD TRIMESTER, UNSPECIFIED GRAVIDITY: Primary | ICD-10-CM

## 2024-12-12 PROCEDURE — 0502F SUBSEQUENT PRENATAL CARE: CPT | Mod: CPTII,,, | Performed by: OBSTETRICS & GYNECOLOGY

## 2024-12-12 NOTE — PROGRESS NOTES
34 y.o. female  at 35w1d  Reports + FM, denies VB, Leaking or regular CTX  Doing well  TW  GBS collected    Reviewed warning signs, normal fetal movements, labor precautions discussed in detail   RTC 1 wks, or sooner prn. Labor and delivery PRN   Chaperone for pelvic exam present

## 2024-12-13 LAB
GROUP B STREP MOLECULAR: NEGATIVE
PENICILLIN ALLERGIC: NO

## 2024-12-19 ENCOUNTER — ROUTINE PRENATAL (OUTPATIENT)
Dept: OBSTETRICS AND GYNECOLOGY | Facility: CLINIC | Age: 34
End: 2024-12-19
Payer: COMMERCIAL

## 2024-12-19 VITALS
BODY MASS INDEX: 33.22 KG/M2 | DIASTOLIC BLOOD PRESSURE: 76 MMHG | SYSTOLIC BLOOD PRESSURE: 127 MMHG | WEIGHT: 181.63 LBS | HEART RATE: 85 BPM

## 2024-12-19 DIAGNOSIS — O35.EXX0 ENCOUNTER FOR REPEAT ULTRASOUND OF FETAL PYELECTASIS, ANTEPARTUM, SINGLE OR UNSPECIFIED FETUS: Primary | ICD-10-CM

## 2024-12-19 DIAGNOSIS — Z34.90 PREGNANCY, UNSPECIFIED GESTATIONAL AGE: Primary | ICD-10-CM

## 2024-12-19 PROCEDURE — 0502F SUBSEQUENT PRENATAL CARE: CPT | Mod: CPTII,,, | Performed by: OBSTETRICS & GYNECOLOGY

## 2024-12-19 NOTE — PROGRESS NOTES
34 y.o. female  at 36w1d  Reports + FM, denies VB, Leaking or regular CTX  Doing well   TW  GBS collected  neg  Reviewed warning signs, normal fetal movements, labor precautions discussed in detail   RTC 1 wks, or sooner prn. Labor and delivery PRN   Chaperone was present for pelvic exam

## 2024-12-26 ENCOUNTER — ROUTINE PRENATAL (OUTPATIENT)
Dept: OBSTETRICS AND GYNECOLOGY | Facility: CLINIC | Age: 34
End: 2024-12-26
Payer: COMMERCIAL

## 2024-12-26 VITALS
SYSTOLIC BLOOD PRESSURE: 121 MMHG | WEIGHT: 183.63 LBS | DIASTOLIC BLOOD PRESSURE: 75 MMHG | BODY MASS INDEX: 33.58 KG/M2 | HEART RATE: 83 BPM

## 2024-12-26 DIAGNOSIS — Z34.90 PREGNANCY, UNSPECIFIED GESTATIONAL AGE: Primary | ICD-10-CM

## 2024-12-26 PROCEDURE — 0502F SUBSEQUENT PRENATAL CARE: CPT | Mod: CPTII,,, | Performed by: OBSTETRICS & GYNECOLOGY

## 2024-12-26 NOTE — PROGRESS NOTES
34 y.o. female  at 37w1d  Reports + FM, denies VB, Leaking or regular CTX  Doing well without   TW  GBS collected    Reviewed warning signs, normal fetal movements, labor precautions discussed in detail   RTC 1 wks, or sooner prn. Labor and delivery PRN   Chaperone was present for pelvic exam

## 2025-01-01 NOTE — PROGRESS NOTES
Nadira Nicole ( 1990)    Indication for follow up consultation:  Intrauterine pregnancy at 38w1d  Mild bilateral upper urinary tract dilation with low risk NIPT    Provider requesting consultation: Gianfranco Cardona III, MD    Dear Ho,    I had the pleasure of seeing Nadira Nicole for follow up consultation and sonography today. Thank you again for allowing us to assist in her care.  She is a 34 y.o.  at 38w1d Estimated Date of Delivery: 1/15/25.   Today as a follow-up to look at the fetal kidneys.  At her previous visit here the renal pelves measured at 8 and 9 mm.  NIPT negative.   The patient has no complaints today and reports good fetal movement.    The patient denies any vaginal bleeding, loss of fluid or contraction pain today.     Medications:  Current Outpatient Medications   Medication Instructions    aspirin (ECOTRIN) 81 mg, Oral, Daily    DEXILANT 60 mg capsule     LINZESS 145 mcg Cap capsule     prenatal 25/iron/folate 6/dha (PRENA1 ORAL) Take by mouth.     Physical exam:  /80   Pulse 66   Resp 18   Wt 83.9 kg (185 lb)   LMP 2024 (Exact Date)   SpO2 98%   BMI 33.84 kg/m²   Gen: WDWN in NAD  HEENT: WNL  Abdomen: Soft, non-tender, non-distended  Skin: No rash or jaundice  Extremities: Symmetric in size, no clubbing, cyanosis, or edema    Ultrasound: A repeat maternal fetal medicine ultrasound was performed today.    We demonstrated a Ospina gestation in breech presentation.  Placenta posterior.  JOSE ENRIQUE 7.4 cm.  BPP 8/8.  Fetus measures 8 lb 0 oz at the 77th percentile.    The renal pelves measure essentially the same at 8 mm and 9 mm.  There is no caliectasis.  Please SEE ATTACHED REPORT for full details.    Assessment:  Intrauterine pregnancy at 38w1d  Mild bilateral upper urinary tract dilation with low risk NIPT  Breech    Recommendations:   Glad to report that both renal pelves are measuring the same.  Additionally I do not see any caliectasis so this most likely  represents a benign renal pelvis dilation frequently seen in male fetuses that should resolve as time goes on.  Nonetheless, ultrasound imaging of the kidneys is recommended post delivery.  Ideal time to do this is 3 weeks post delivery whenever they no longer under the influence of maternal hormones.    Patient is scheduled for primary  delivery on  given the persistent breech presentation    We greatly appreciate you allowing us to assist in her care.  Please call with any questions or concerns.    Sincerely,     Raúl Treadwell Jr., MD  Maternal Fetal Medicine    Total time personally involved in this patient's care was 25 minutes.

## 2025-01-02 ENCOUNTER — PROCEDURE VISIT (OUTPATIENT)
Dept: MATERNAL FETAL MEDICINE | Facility: CLINIC | Age: 35
End: 2025-01-02
Payer: COMMERCIAL

## 2025-01-02 VITALS
RESPIRATION RATE: 18 BRPM | SYSTOLIC BLOOD PRESSURE: 126 MMHG | WEIGHT: 185 LBS | HEART RATE: 66 BPM | BODY MASS INDEX: 33.84 KG/M2 | OXYGEN SATURATION: 98 % | DIASTOLIC BLOOD PRESSURE: 80 MMHG

## 2025-01-02 DIAGNOSIS — O35.EXX0 ENCOUNTER FOR REPEAT ULTRASOUND OF FETAL PYELECTASIS, ANTEPARTUM, SINGLE OR UNSPECIFIED FETUS: ICD-10-CM

## 2025-01-02 NOTE — PROGRESS NOTES
Nadira is here for followup MFM consultation for bilateral fetal pyelectasis, referred by Dr. Cardona. She is scheduled for delivery 1/9/25.    She is feeling fetal movement.    Nadira denies vaginal bleeding, loss of fluid, recurrent contractions.    She is tatking ASA 81 mg PO daily, Nexium, Linzess, prenatal vitamin and Fe.    Vitals:    01/02/25 0905   BP: 126/80   Pulse: 66   Resp: 18   SpO2: 98%   Weight: 83.9 kg (185 lb)     BMI:                    33.84 kg/m^2

## 2025-01-07 ENCOUNTER — ROUTINE PRENATAL (OUTPATIENT)
Dept: OBSTETRICS AND GYNECOLOGY | Facility: CLINIC | Age: 35
End: 2025-01-07
Payer: COMMERCIAL

## 2025-01-07 ENCOUNTER — PATIENT MESSAGE (OUTPATIENT)
Dept: OBSTETRICS AND GYNECOLOGY | Facility: CLINIC | Age: 35
End: 2025-01-07

## 2025-01-07 ENCOUNTER — OUTSIDE PLACE OF SERVICE (OUTPATIENT)
Dept: OBSTETRICS AND GYNECOLOGY | Facility: CLINIC | Age: 35
End: 2025-01-07

## 2025-01-07 ENCOUNTER — PROCEDURE VISIT (OUTPATIENT)
Dept: OBSTETRICS AND GYNECOLOGY | Facility: CLINIC | Age: 35
End: 2025-01-07
Payer: COMMERCIAL

## 2025-01-07 VITALS
SYSTOLIC BLOOD PRESSURE: 124 MMHG | HEART RATE: 80 BPM | DIASTOLIC BLOOD PRESSURE: 78 MMHG | WEIGHT: 187 LBS | BODY MASS INDEX: 34.2 KG/M2

## 2025-01-07 DIAGNOSIS — Z34.90 PREGNANCY, UNSPECIFIED GESTATIONAL AGE: ICD-10-CM

## 2025-01-07 DIAGNOSIS — O41.03X1 OLIGOHYDRAMNIOS ANTEPARTUM, THIRD TRIMESTER, FETUS 1: Primary | ICD-10-CM

## 2025-01-07 PROCEDURE — 76815 OB US LIMITED FETUS(S): CPT | Mod: 26,S$PBB,, | Performed by: OBSTETRICS & GYNECOLOGY

## 2025-01-07 PROCEDURE — 0502F SUBSEQUENT PRENATAL CARE: CPT | Mod: CPTII,,, | Performed by: OBSTETRICS & GYNECOLOGY

## 2025-01-07 PROCEDURE — 59510 CESAREAN DELIVERY: CPT | Mod: AT,,, | Performed by: OBSTETRICS & GYNECOLOGY

## 2025-01-07 NOTE — PROGRESS NOTES
.34 y.o. female  at 38w6d  Reports + FM, denies VB, Leaking or regular CTX  Doing well without   TWG:   GBS [id number]    Reviewed warning signs, normal fetal movements, labor precautions discussed in detail   RTC 1 wks, or sooner prn. Labor and delivery PRN   Chaperone was present for pelvic exam     Breech oligo  to L&D discussed in detail with mom

## 2025-01-14 ENCOUNTER — POSTPARTUM VISIT (OUTPATIENT)
Dept: OBSTETRICS AND GYNECOLOGY | Facility: CLINIC | Age: 35
End: 2025-01-14
Payer: COMMERCIAL

## 2025-01-14 VITALS
HEIGHT: 62 IN | DIASTOLIC BLOOD PRESSURE: 78 MMHG | BODY MASS INDEX: 30.18 KG/M2 | WEIGHT: 164 LBS | HEART RATE: 80 BPM | SYSTOLIC BLOOD PRESSURE: 122 MMHG

## 2025-01-14 PROCEDURE — 99024 POSTOP FOLLOW-UP VISIT: CPT | Mod: ,,, | Performed by: OBSTETRICS & GYNECOLOGY

## 2025-01-14 NOTE — PROGRESS NOTES
Subjective:       Patient ID: Nadira Nicole is a 34 y.o. female.    Chief Complaint:  Postpartum Care      History of Present Illness  She is doing well. No abnormal bleeding, No GI or Gu concerns,    No fever  post partum blues/anxiety discussed  no PP Blues   good pain control    her       GYN & OB History  Patient's last menstrual period was 2024 (exact date).     Date of Last Pap: No result found    OB History    Para Term  AB Living   1 0 0 0 0 0   SAB IAB Ectopic Multiple Live Births   0 0 0 0 0      # Outcome Date GA Lbr Jose Antonio/2nd Weight Sex Type Anes PTL Lv   1 Current                Review of Systems  Review of Systems          Objective:    Physical Exam    Her incision looks very good.  Healing nicely  no evidence of infection    No sig abdominal tenderness        Assessment:        1. 2 weeks postpartum follow-up                 Plan:             She is to return for any reason.     I would like her to follow up in 4 weeks or sooner if needed   pelvic rest  Precautions discussed.   I am ok with driving as long as she is off pain meds       Fever or foul D/C needs to be evaluated.

## 2025-02-12 ENCOUNTER — TELEPHONE (OUTPATIENT)
Dept: OBSTETRICS AND GYNECOLOGY | Facility: CLINIC | Age: 35
End: 2025-02-12
Payer: COMMERCIAL

## 2025-02-12 NOTE — TELEPHONE ENCOUNTER
----- Message from Anastacia sent at 2/11/2025  4:17 PM CST -----  Contact: Nadira  Type:  Needs Medical Advice    Who Called: Nadira Waddell wants to speak about birth control options before her post partum appointment  Pharmacy name and phone #:    [facility] - [place], [place] - [id number] [facility] [address]  Phone: [phone] Fax: [phone]  Would the patient rather a call back or a response via [facility]? Call back  Best Call Back Number: [phone]  Thanks!

## 2025-02-12 NOTE — TELEPHONE ENCOUNTER
Spoke to patient, she would like to get started back on birth control. She is not breastfeeding.   Previously she tried several different forms of birth control due to endometriosis.   She liked the nexplanon the best but is open to other options.   She would like to have Dr Cardona recommendation.  Advised I will send him a message to see what is recommended and we will contact her. She verbalized understanding.     Please advise on what you would like to start her on.

## 2025-02-14 ENCOUNTER — PATIENT MESSAGE (OUTPATIENT)
Dept: OBSTETRICS AND GYNECOLOGY | Facility: CLINIC | Age: 35
End: 2025-02-14
Payer: COMMERCIAL

## 2025-02-14 RX ORDER — NORETHINDRONE ACETATE AND ETHINYL ESTRADIOL, ETHINYL ESTRADIOL AND FERROUS FUMARATE 1MG-10(24)
1 KIT ORAL DAILY
Qty: 84 TABLET | Refills: 3 | Status: SHIPPED | OUTPATIENT
Start: 2025-02-14 | End: 2026-02-14

## 2025-02-17 ENCOUNTER — POSTPARTUM VISIT (OUTPATIENT)
Dept: OBSTETRICS AND GYNECOLOGY | Facility: CLINIC | Age: 35
End: 2025-02-17
Payer: COMMERCIAL

## 2025-02-17 VITALS
WEIGHT: 156.63 LBS | BODY MASS INDEX: 28.82 KG/M2 | SYSTOLIC BLOOD PRESSURE: 105 MMHG | DIASTOLIC BLOOD PRESSURE: 52 MMHG | HEART RATE: 80 BPM | HEIGHT: 62 IN

## 2025-02-17 RX ORDER — ESCITALOPRAM OXALATE 10 MG/1
TABLET ORAL
Qty: 30 TABLET | Refills: 2 | Status: SHIPPED | OUTPATIENT
Start: 2025-02-17

## 2025-02-17 NOTE — PROGRESS NOTES
Subjective:       Patient ID: Nadira Nicole is a 34 y.o. female.    Chief Complaint:  Postpartum Care      History of Present Illness  She is doing well.  No new health issues,  No abnormal bleeding, No GI or Gu concerns,  No postpartum blues or depression.   Birth control discussed. No nursing    She has some PP blues  waves of emotion.  No SI or HI    she does not feel with drawn   tearful  she would be interspersed in taking a med  .   HPI      GYN & OB History  Patient's last menstrual period was 2024 (exact date).     Date of Last Pap: No result found    OB History    Para Term  AB Living   1 0 0 0 0 0   SAB IAB Ectopic Multiple Live Births   0 0 0 0 0      # Outcome Date GA Lbr Jose Antonio/2nd Weight Sex Type Anes PTL Lv   1                 Review of Systems  Review of Systems   Constitutional:  Negative for activity change.   Eyes:  Negative for visual disturbance.   Respiratory:  Negative for shortness of breath.    Cardiovascular:  Negative for chest pain.   Gastrointestinal:  Negative for abdominal pain.   Genitourinary:  Negative for vaginal bleeding.        No abnormal vaginal bleeding   Musculoskeletal:  Negative for back pain.   Integumentary:  Negative for rash and breast mass.   Neurological:  Negative for numbness.   Psychiatric/Behavioral:          No mood disturbance or changes    Breast: Negative for mass            Objective:    Physical Exam:   Constitutional: She appears well-developed.             Abdominal: Soft.   Incision is dry intact, and no evidence of infection     Genitourinary:    Vagina and uterus normal.   There is no tenderness on the right labia. There is no tenderness on the left labia. Right adnexum displays no fullness. Left adnexum displays no fullness.               Neurological: She is alert.     Psychiatric: She has a normal mood and affect. Her speech is normal and behavior is normal.        Assessment:      No diagnosis found.             Plan:              Her last pap smear is discussed she is to return for her annual and pap.    Birth control discussed   All questions answered  Chaperone was present

## 2025-02-18 ENCOUNTER — PATIENT MESSAGE (OUTPATIENT)
Dept: OBSTETRICS AND GYNECOLOGY | Facility: CLINIC | Age: 35
End: 2025-02-18
Payer: COMMERCIAL

## 2025-03-11 ENCOUNTER — PATIENT MESSAGE (OUTPATIENT)
Dept: OBSTETRICS AND GYNECOLOGY | Facility: CLINIC | Age: 35
End: 2025-03-11
Payer: COMMERCIAL

## 2025-03-12 ENCOUNTER — PATIENT MESSAGE (OUTPATIENT)
Dept: OBSTETRICS AND GYNECOLOGY | Facility: CLINIC | Age: 35
End: 2025-03-12
Payer: COMMERCIAL

## 2025-07-30 ENCOUNTER — PATIENT MESSAGE (OUTPATIENT)
Dept: OBSTETRICS AND GYNECOLOGY | Facility: CLINIC | Age: 35
End: 2025-07-30
Payer: COMMERCIAL

## 2025-08-18 ENCOUNTER — OFFICE VISIT (OUTPATIENT)
Dept: OBSTETRICS AND GYNECOLOGY | Facility: CLINIC | Age: 35
End: 2025-08-18
Payer: COMMERCIAL

## 2025-08-18 VITALS
WEIGHT: 152.81 LBS | BODY MASS INDEX: 27.95 KG/M2 | DIASTOLIC BLOOD PRESSURE: 74 MMHG | SYSTOLIC BLOOD PRESSURE: 115 MMHG | HEART RATE: 66 BPM

## 2025-08-18 DIAGNOSIS — Z01.419 WELL WOMAN EXAM WITH ROUTINE GYNECOLOGICAL EXAM: Primary | ICD-10-CM

## 2025-08-18 DIAGNOSIS — N39.0 RECURRENT UTI: ICD-10-CM

## 2025-08-18 PROCEDURE — 99395 PREV VISIT EST AGE 18-39: CPT | Mod: S$PBB,,, | Performed by: OBSTETRICS & GYNECOLOGY

## 2025-08-18 PROCEDURE — 3074F SYST BP LT 130 MM HG: CPT | Mod: CPTII,,, | Performed by: OBSTETRICS & GYNECOLOGY

## 2025-08-18 PROCEDURE — 1159F MED LIST DOCD IN RCRD: CPT | Mod: CPTII,,, | Performed by: OBSTETRICS & GYNECOLOGY

## 2025-08-18 PROCEDURE — 3008F BODY MASS INDEX DOCD: CPT | Mod: CPTII,,, | Performed by: OBSTETRICS & GYNECOLOGY

## 2025-08-18 PROCEDURE — 3078F DIAST BP <80 MM HG: CPT | Mod: CPTII,,, | Performed by: OBSTETRICS & GYNECOLOGY

## 2025-08-18 RX ORDER — NITROFURANTOIN 25; 75 MG/1; MG/1
100 CAPSULE ORAL 2 TIMES DAILY
Qty: 30 CAPSULE | Refills: 1 | Status: SHIPPED | OUTPATIENT
Start: 2025-08-18 | End: 2025-08-25

## 2025-08-19 LAB — Lab: NORMAL
